# Patient Record
Sex: FEMALE | Race: WHITE | NOT HISPANIC OR LATINO | Employment: FULL TIME | URBAN - METROPOLITAN AREA
[De-identification: names, ages, dates, MRNs, and addresses within clinical notes are randomized per-mention and may not be internally consistent; named-entity substitution may affect disease eponyms.]

---

## 2023-04-20 LAB
ERYTHROCYTE DISTRIBUTION WIDTH (RATIO) BY AUTOMATED COUNT: 12.6 % (ref 11.5–14.5)
ERYTHROCYTE MEAN CORPUSCULAR HEMOGLOBIN CONCENTRATION (G/DL) BY AUTOMATED: 31.9 G/DL (ref 32–36)
ERYTHROCYTE MEAN CORPUSCULAR VOLUME (FL) BY AUTOMATED COUNT: 97 FL (ref 80–100)
ERYTHROCYTES (10*6/UL) IN BLOOD BY AUTOMATED COUNT: 4.23 X10E12/L (ref 4–5.2)
HEMATOCRIT (%) IN BLOOD BY AUTOMATED COUNT: 41.1 % (ref 36–46)
HEMOGLOBIN (G/DL) IN BLOOD: 13.1 G/DL (ref 12–16)
IRON (UG/DL) IN SER/PLAS: 80 UG/DL (ref 35–150)
IRON BINDING CAPACITY (UG/DL) IN SER/PLAS: 376 UG/DL (ref 240–445)
IRON SATURATION (%) IN SER/PLAS: 21 % (ref 25–45)
LEUKOCYTES (10*3/UL) IN BLOOD BY AUTOMATED COUNT: 4.3 X10E9/L (ref 4.4–11.3)
NRBC (PER 100 WBCS) BY AUTOMATED COUNT: 0 /100 WBC (ref 0–0)
PLATELETS (10*3/UL) IN BLOOD AUTOMATED COUNT: 226 X10E9/L (ref 150–450)

## 2023-08-26 LAB
ERYTHROCYTE DISTRIBUTION WIDTH (RATIO) BY AUTOMATED COUNT: 12.5 % (ref 11.5–14.5)
ERYTHROCYTE MEAN CORPUSCULAR HEMOGLOBIN CONCENTRATION (G/DL) BY AUTOMATED: 33.4 G/DL (ref 32–36)
ERYTHROCYTE MEAN CORPUSCULAR VOLUME (FL) BY AUTOMATED COUNT: 97 FL (ref 80–100)
ERYTHROCYTES (10*6/UL) IN BLOOD BY AUTOMATED COUNT: 4.31 X10E12/L (ref 4–5.2)
HEMATOCRIT (%) IN BLOOD BY AUTOMATED COUNT: 41.9 % (ref 36–46)
HEMOGLOBIN (G/DL) IN BLOOD: 14 G/DL (ref 12–16)
IRON (UG/DL) IN SER/PLAS: 180 UG/DL (ref 35–150)
IRON BINDING CAPACITY (UG/DL) IN SER/PLAS: 372 UG/DL (ref 240–445)
IRON SATURATION (%) IN SER/PLAS: 48 % (ref 25–45)
LEUKOCYTES (10*3/UL) IN BLOOD BY AUTOMATED COUNT: 3.9 X10E9/L (ref 4.4–11.3)
NRBC (PER 100 WBCS) BY AUTOMATED COUNT: 0 /100 WBC (ref 0–0)
PLATELETS (10*3/UL) IN BLOOD AUTOMATED COUNT: 221 X10E9/L (ref 150–450)

## 2024-01-15 DIAGNOSIS — R56.9 SEIZURE (MULTI): Primary | ICD-10-CM

## 2024-01-15 RX ORDER — CARBAMAZEPINE 300 MG/1
300 CAPSULE, EXTENDED RELEASE ORAL 2 TIMES DAILY
Qty: 60 CAPSULE | Refills: 11 | Status: SHIPPED | OUTPATIENT
Start: 2024-01-15 | End: 2024-01-19 | Stop reason: SDUPTHER

## 2024-01-15 RX ORDER — LAMOTRIGINE 100 MG/1
200 TABLET ORAL 2 TIMES DAILY
COMMUNITY
End: 2024-01-15 | Stop reason: SDUPTHER

## 2024-01-15 RX ORDER — LAMOTRIGINE 100 MG/1
200 TABLET ORAL 2 TIMES DAILY
Qty: 120 TABLET | Refills: 11 | Status: SHIPPED | OUTPATIENT
Start: 2024-01-15 | End: 2024-01-19 | Stop reason: SDUPTHER

## 2024-01-15 RX ORDER — CARBAMAZEPINE 300 MG/1
300 CAPSULE, EXTENDED RELEASE ORAL 2 TIMES DAILY
COMMUNITY
End: 2024-01-15 | Stop reason: SDUPTHER

## 2024-01-19 DIAGNOSIS — R56.9 SEIZURE (MULTI): ICD-10-CM

## 2024-01-19 RX ORDER — LAMOTRIGINE 100 MG/1
200 TABLET ORAL 2 TIMES DAILY
Qty: 120 TABLET | Refills: 11 | Status: SHIPPED | OUTPATIENT
Start: 2024-01-19 | End: 2025-01-18

## 2024-01-19 RX ORDER — CARBAMAZEPINE 300 MG/1
300 CAPSULE, EXTENDED RELEASE ORAL 2 TIMES DAILY
Qty: 60 CAPSULE | Refills: 11 | Status: SHIPPED | OUTPATIENT
Start: 2024-01-19 | End: 2025-01-18

## 2024-03-15 ENCOUNTER — HOSPITAL ENCOUNTER (OUTPATIENT)
Dept: RADIOLOGY | Facility: CLINIC | Age: 64
Discharge: HOME | End: 2024-03-15
Payer: COMMERCIAL

## 2024-03-15 DIAGNOSIS — N20.0 CALCULUS OF KIDNEY: ICD-10-CM

## 2024-03-15 PROCEDURE — 76770 US EXAM ABDO BACK WALL COMP: CPT | Performed by: STUDENT IN AN ORGANIZED HEALTH CARE EDUCATION/TRAINING PROGRAM

## 2024-03-15 PROCEDURE — 76770 US EXAM ABDO BACK WALL COMP: CPT

## 2024-03-19 ENCOUNTER — TELEMEDICINE (OUTPATIENT)
Dept: UROLOGY | Facility: CLINIC | Age: 64
End: 2024-03-19
Payer: COMMERCIAL

## 2024-03-19 DIAGNOSIS — N20.0 NEPHROLITHIASIS: Primary | ICD-10-CM

## 2024-03-19 PROCEDURE — 99213 OFFICE O/P EST LOW 20 MIN: CPT | Performed by: UROLOGY

## 2024-03-19 NOTE — PROGRESS NOTES
Subjective     This visit was completed via telemedicine. All issues as below were discussed and addressed but no physical exam was performed unless allowed by visual confirmation. If it was felt that the patient should be evaluated in clinic, then they were directed there. Patient verbally consented to visit.    Mirtha Weber is a 63 y.o. female with history of nephrolithiasis presenting today to review renal US. Patient has no new complaints. Denies any recent gross hematuria, fevers, chills, urinary retention, intractable flank or abdominal pain, nausea or vomiting.        Past Medical History:   Diagnosis Date    Personal history of other diseases of the nervous system and sense organs 10/05/2016    History of migraine headaches    Personal history of other drug therapy 02/05/2018    History of hepatitis A vaccination    Personal history of other specified conditions 04/15/2014    History of headache     Past Surgical History:   Procedure Laterality Date    COLONOSCOPY  02/05/2018    Complete Colonoscopy     No family history on file.  Current Outpatient Medications   Medication Sig Dispense Refill    carBAMazepine (Carbatrol) 300 mg 12 hr capsule Take 1 capsule (300 mg) by mouth 2 times a day. 60 capsule 11    lamoTRIgine (LaMICtal) 100 mg tablet Take 2 tablets (200 mg) by mouth 2 times a day. 120 tablet 11     No current facility-administered medications for this visit.     Not on File  Social History     Socioeconomic History    Marital status: Single     Spouse name: Not on file    Number of children: Not on file    Years of education: Not on file    Highest education level: Not on file   Occupational History    Not on file   Tobacco Use    Smoking status: Not on file    Smokeless tobacco: Not on file   Substance and Sexual Activity    Alcohol use: Not on file    Drug use: Not on file    Sexual activity: Not on file   Other Topics Concern    Not on file   Social History Narrative    Not on file     Social  Determinants of Health     Financial Resource Strain: Not on file   Food Insecurity: Not on file   Transportation Needs: Not on file   Physical Activity: Not on file   Stress: Not on file   Social Connections: Not on file   Intimate Partner Violence: Not on file   Housing Stability: Not on file       Review of Systems  Pertinent items are noted in HPI.    Objective       Lab Review  Lab Results   Component Value Date    WBC 3.9 (L) 08/26/2023    RBC 4.31 08/26/2023    HGB 14.0 08/26/2023    HCT 41.9 08/26/2023     08/26/2023      Lab Results   Component Value Date    BUN 11 03/17/2023    CREATININE 0.7 03/17/2023          Assessment/Plan   Diagnoses and all orders for this visit:  Nephrolithiasis  -     US renal complete; Future  Nephrolithiasis     I reviewed renal US from 3/15/2024 which showed non-obstructing right renal calculus measuring 5mm without hydronephrosis.       We will follow up annually with renal US to monitor stone presence and formation.     All questions were answered to the patient's satisfaction. Patient agrees with the plan and wishes to proceed. Follow-up will be scheduled appropriately.     Scribed for Dr. Coy by Kay Miller. I , Dr Coy, have personally reviewed and agreed with the information entered by the Virtual Scribe.

## 2024-04-09 ENCOUNTER — APPOINTMENT (OUTPATIENT)
Dept: UROLOGY | Facility: CLINIC | Age: 64
End: 2024-04-09
Payer: COMMERCIAL

## 2024-06-25 DIAGNOSIS — R56.9 SEIZURE (MULTI): ICD-10-CM

## 2024-06-25 RX ORDER — CARBAMAZEPINE 300 MG/1
300 CAPSULE, EXTENDED RELEASE ORAL 2 TIMES DAILY
Qty: 180 CAPSULE | Refills: 3 | Status: SHIPPED | OUTPATIENT
Start: 2024-06-25 | End: 2025-06-25

## 2024-06-25 RX ORDER — LAMOTRIGINE 100 MG/1
200 TABLET ORAL 2 TIMES DAILY
Qty: 360 TABLET | Refills: 3 | Status: SHIPPED | OUTPATIENT
Start: 2024-06-25 | End: 2025-06-25

## 2025-03-07 ENCOUNTER — HOSPITAL ENCOUNTER (OUTPATIENT)
Dept: RADIOLOGY | Facility: CLINIC | Age: 65
Discharge: HOME | End: 2025-03-07
Payer: COMMERCIAL

## 2025-03-07 DIAGNOSIS — N20.0 NEPHROLITHIASIS: ICD-10-CM

## 2025-03-07 PROCEDURE — 76770 US EXAM ABDO BACK WALL COMP: CPT

## 2025-03-21 ENCOUNTER — APPOINTMENT (OUTPATIENT)
Dept: UROLOGY | Facility: CLINIC | Age: 65
End: 2025-03-21
Payer: COMMERCIAL

## 2025-03-21 DIAGNOSIS — N20.0 NEPHROLITHIASIS: Primary | ICD-10-CM

## 2025-03-21 PROCEDURE — 1036F TOBACCO NON-USER: CPT | Performed by: UROLOGY

## 2025-03-21 PROCEDURE — 99213 OFFICE O/P EST LOW 20 MIN: CPT | Performed by: UROLOGY

## 2025-03-21 NOTE — PROGRESS NOTES
Scribed for Dr. Danita Coy by Clint Pozo. I, Dr. Danita Coy, have personally reviewed and agreed with the information entered by the Virtual Scribe. This visit was completed via telemedicine. All issues as below were discussed and addressed but no physical exam was performed unless allowed by visual confirmation. If it was felt that the patient should be evaluated in clinic, then they were directed there. Patient verbal consented to the visit. 03/21/25    History of Present Illness:  TODAY: (03/21/25)  Mirtha Weber is a 64 y.o. female with history of nephrolithiasis. Presents virtually for ultrasound for annual stone surveillance. Reports she has been doing well overall. No acute or worsening complaints. No flank pain, gross hematuria, or UTI's in the interim.     Renal ultrasound (03/07/25) personally reviewed and independently interpreted.  Right lower pole nonobstructive nephrolithiasis measuring 0.4 cm   Stone burden appears improved compared to last year.     Past Medical History:   Diagnosis Date    Personal history of other diseases of the nervous system and sense organs 10/05/2016    History of migraine headaches    Personal history of other drug therapy 02/05/2018    History of hepatitis A vaccination    Personal history of other specified conditions 04/15/2014    History of headache     Past Surgical History:   Procedure Laterality Date    COLONOSCOPY  02/05/2018    Complete Colonoscopy     No family history on file.  Social History     Tobacco Use   Smoking Status Never   Smokeless Tobacco Never     Current Outpatient Medications   Medication Sig Dispense Refill    carBAMazepine (Carbatrol) 300 mg 12 hr capsule Take 1 capsule (300 mg) by mouth 2 times a day. 180 capsule 3    lamoTRIgine (LaMICtal) 100 mg tablet TAKE 2 TABLETS (200 MG) BY MOUTH 2 TIMES A DAY. 360 tablet 3     No current facility-administered medications for this visit.     Not on File  Past medical, surgical, family and social  history in the chart was reviewed and is accurate including any additions to what is in this HPI.    Review of systems:   Pertinent information as listed in the HPI.        Objective   There were no vitals taken for this visit.  Physical Exam:  Exam limited 2/2 being a telehealth visit.     Lab Review:  Lab Results   Component Value Date    WBC 3.9 (L) 08/26/2023    RBC 4.31 08/26/2023    HGB 14.0 08/26/2023    HCT 41.9 08/26/2023     08/26/2023      Lab Results   Component Value Date    BUN 11 03/17/2023    CREATININE 0.7 03/17/2023      Lab Results   Component Value Date    HGBA1C 5.4 09/06/2016     Lab Results   Component Value Date    CHOL 247 (H) 09/08/2022    TRIG 58 09/08/2022    .9 09/08/2022    ALT 13 03/17/2023    AST 16 03/17/2023     03/17/2023    K 4.7 03/17/2023     03/17/2023    CO2 25 03/17/2023        ASSESSMENT:  Problem List Items Addressed This Visit       Nephrolithiasis - Primary    Relevant Orders    US renal complete      PLAN:  #Nephrolithiasis, RLP  Elects to continue stone surveillance.   RTC in 1 year with a renal ultrasound prior to.     Discussion:  We discussed that most calculi under 5 mm can be safely observed. This recommendation is based on large series looking at spontaneous passage rates that suggest that the likelihood of stone passage is highest for stones under 4 mm size (approximately 70-80%), moderate for stones between 4 and 6 mm (50%), and lowest for stones 6 mm or greater (20-30%).    I discussed with the patient the management options for kidney and ureteral stones, including observation, medical expulsive therapy, stent placement, rigid or flexible ureteroscopy with stone basket or laser stone fragmentation, ESWL; as well as advanced options for larger stones such as percutaneous and surgical approaches.    Patient was educated on stone prevention dietary modifications which include increased water intake, citric acid supplementation in the  form of lemon juice, low oxalate diet, moderate protein intake and low sodium intake. In addition, suggested avoiding dark-colored sodas. A daily urine output of 2.5 L is also recommended if feasible.     All questions were answered to the patient’s satisfaction.  Patient agrees with the plan and wishes to proceed.  Continue follow-up for ongoing care of her chronic medical conditions.    Scribed for Dr. Danita Coy by Clint Pozo.  I, Dr. Danita Coy, have personally reviewed and agreed with the information entered by the Virtual Scribe. 03/21/25

## 2025-06-18 ENCOUNTER — HOSPITAL ENCOUNTER (OUTPATIENT)
Dept: RADIOLOGY | Facility: CLINIC | Age: 65
Discharge: HOME | End: 2025-06-18
Payer: COMMERCIAL

## 2025-06-18 ENCOUNTER — OFFICE VISIT (OUTPATIENT)
Dept: OBSTETRICS AND GYNECOLOGY | Facility: CLINIC | Age: 65
End: 2025-06-18
Payer: COMMERCIAL

## 2025-06-18 VITALS
SYSTOLIC BLOOD PRESSURE: 117 MMHG | HEIGHT: 66 IN | WEIGHT: 145.8 LBS | DIASTOLIC BLOOD PRESSURE: 72 MMHG | BODY MASS INDEX: 23.43 KG/M2

## 2025-06-18 VITALS — WEIGHT: 145.8 LBS | HEIGHT: 66 IN | BODY MASS INDEX: 23.43 KG/M2

## 2025-06-18 DIAGNOSIS — Z12.31 ENCOUNTER FOR SCREENING MAMMOGRAM FOR MALIGNANT NEOPLASM OF BREAST: ICD-10-CM

## 2025-06-18 DIAGNOSIS — L29.2 VULVAR ITCHING: ICD-10-CM

## 2025-06-18 DIAGNOSIS — Z01.419 ENCOUNTER FOR ANNUAL ROUTINE GYNECOLOGICAL EXAMINATION: Primary | ICD-10-CM

## 2025-06-18 PROCEDURE — 3008F BODY MASS INDEX DOCD: CPT

## 2025-06-18 PROCEDURE — 99396 PREV VISIT EST AGE 40-64: CPT

## 2025-06-18 PROCEDURE — 77067 SCR MAMMO BI INCL CAD: CPT

## 2025-06-18 PROCEDURE — 77063 BREAST TOMOSYNTHESIS BI: CPT | Performed by: RADIOLOGY

## 2025-06-18 PROCEDURE — 77067 SCR MAMMO BI INCL CAD: CPT | Performed by: RADIOLOGY

## 2025-06-18 PROCEDURE — 1036F TOBACCO NON-USER: CPT

## 2025-06-18 RX ORDER — CLOTRIMAZOLE AND BETAMETHASONE DIPROPIONATE 10; .64 MG/G; MG/G
1 CREAM TOPICAL AS NEEDED
Qty: 15 G | Refills: 2 | Status: SHIPPED | OUTPATIENT
Start: 2025-06-18 | End: 2025-07-02

## 2025-06-18 ASSESSMENT — ENCOUNTER SYMPTOMS
SHORTNESS OF BREATH: 0
UNEXPECTED WEIGHT CHANGE: 0
FEVER: 0
DEPRESSION: 0
CHILLS: 0
NAUSEA: 0
ABDOMINAL PAIN: 0
FATIGUE: 0
DYSURIA: 0
COUGH: 0
HEADACHES: 0
LOSS OF SENSATION IN FEET: 0
DIZZINESS: 0
OCCASIONAL FEELINGS OF UNSTEADINESS: 0
COLOR CHANGE: 0
VOMITING: 0

## 2025-06-18 ASSESSMENT — PAIN SCALES - GENERAL: PAINLEVEL_OUTOF10: 0-NO PAIN

## 2025-06-18 ASSESSMENT — LIFESTYLE VARIABLES
HOW MANY STANDARD DRINKS CONTAINING ALCOHOL DO YOU HAVE ON A TYPICAL DAY: 1 OR 2
HOW OFTEN DO YOU HAVE SIX OR MORE DRINKS ON ONE OCCASION: NEVER
SKIP TO QUESTIONS 9-10: 1
HOW OFTEN DO YOU HAVE A DRINK CONTAINING ALCOHOL: MONTHLY OR LESS
AUDIT-C TOTAL SCORE: 1

## 2025-06-18 ASSESSMENT — PATIENT HEALTH QUESTIONNAIRE - PHQ9
2. FEELING DOWN, DEPRESSED OR HOPELESS: NOT AT ALL
1. LITTLE INTEREST OR PLEASURE IN DOING THINGS: NOT AT ALL
SUM OF ALL RESPONSES TO PHQ9 QUESTIONS 1 & 2: 0

## 2025-06-18 NOTE — PROGRESS NOTES
"Subjective   Mirtha Weber is a 64 y.o. female who is here for a routine GYN exam. Last saw her 2023.  -Denies vaginal bleeding, pelvic pain, pressure, or bloating.  -Denies breast or vaginal concerns today.      Complaints:   none  HRT: no  History of abnormal Pap smear: no  History of abnormal mammogram: no      OB History          0    Para   0    Term   0       0    AB   0    Living   0         SAB   0    IAB   0    Ectopic   0    Multiple   0    Live Births   0                  Review of Systems   Constitutional:  Negative for chills, fatigue, fever and unexpected weight change.   Respiratory:  Negative for cough and shortness of breath.    Gastrointestinal:  Negative for abdominal pain, nausea and vomiting.   Genitourinary:  Negative for dyspareunia, dysuria, pelvic pain and vaginal discharge.   Skin:  Negative for color change and rash.   Neurological:  Negative for dizziness and headaches.       Objective   /72   Ht 1.676 m (5' 6\")   Wt 66.1 kg (145 lb 12.8 oz)   BMI 23.53 kg/m²        General:   Alert and oriented, in no acute distress   Neck: Supple. No visible thyromegaly.    Breast/Axilla: Normal to palpation bilaterally without masses, skin changes, or nipple discharge.    Abdomen: Soft, non-tender, without masses or organomegaly   Vulva: Normal architecture without erythema, masses, or lesions.    Vagina: Atrophic mucosa without lesions or masses. No abnormal vaginal discharge.    Cervix: Normal without masses, lesions, or signs of cervicitis   Uterus: Normal, mobile, non-enlarged uterus   Adnexa: Normal without masses or lesions   Pelvic Floor Normal    Psych Normal affect. Normal mood.      Assessment/Plan   Diagnoses and all orders for this visit:  Encounter for annual routine gynecological examination   - UTD on pap smear, next due 2026. Can plan to be final pap.    - Recommended Replens or Hyalogyn moisturizers as needed for any bothersome vaginal atrophy " symptoms.  Encounter for screening mammogram for malignant neoplasm of breast  -     BI mammo bilateral screening tomosynthesis; Future  Vulvar itching  -     clotrimazole-betamethasone (Lotrisone) cream; Apply 1 Application topically if needed (itching or inflammation) for up to 14 days.    Meghann Musa PA-C

## 2025-07-08 ENCOUNTER — OFFICE VISIT (OUTPATIENT)
Dept: PRIMARY CARE | Facility: CLINIC | Age: 65
End: 2025-07-08
Payer: COMMERCIAL

## 2025-07-08 VITALS
SYSTOLIC BLOOD PRESSURE: 110 MMHG | DIASTOLIC BLOOD PRESSURE: 80 MMHG | WEIGHT: 150 LBS | HEIGHT: 66 IN | TEMPERATURE: 97.3 F | BODY MASS INDEX: 24.11 KG/M2 | HEART RATE: 79 BPM | OXYGEN SATURATION: 99 %

## 2025-07-08 DIAGNOSIS — R73.9 HYPERGLYCEMIA: ICD-10-CM

## 2025-07-08 DIAGNOSIS — E55.9 VITAMIN D DEFICIENCY: ICD-10-CM

## 2025-07-08 DIAGNOSIS — G40.309: ICD-10-CM

## 2025-07-08 DIAGNOSIS — E78.2 MIXED HYPERLIPIDEMIA: ICD-10-CM

## 2025-07-08 DIAGNOSIS — Z23 ENCOUNTER FOR IMMUNIZATION: ICD-10-CM

## 2025-07-08 DIAGNOSIS — Z00.00 ANNUAL PHYSICAL EXAM: Primary | ICD-10-CM

## 2025-07-08 DIAGNOSIS — D64.9 ANEMIA, UNSPECIFIED TYPE: ICD-10-CM

## 2025-07-08 DIAGNOSIS — Z11.59 NEED FOR HEPATITIS C SCREENING TEST: ICD-10-CM

## 2025-07-08 PROCEDURE — 99386 PREV VISIT NEW AGE 40-64: CPT | Performed by: NURSE PRACTITIONER

## 2025-07-08 PROCEDURE — 90471 IMMUNIZATION ADMIN: CPT | Performed by: NURSE PRACTITIONER

## 2025-07-08 PROCEDURE — 1036F TOBACCO NON-USER: CPT | Performed by: NURSE PRACTITIONER

## 2025-07-08 PROCEDURE — 90677 PCV20 VACCINE IM: CPT | Performed by: NURSE PRACTITIONER

## 2025-07-08 PROCEDURE — 3008F BODY MASS INDEX DOCD: CPT | Performed by: NURSE PRACTITIONER

## 2025-07-08 RX ORDER — CARBAMAZEPINE 300 MG/1
300 CAPSULE, EXTENDED RELEASE ORAL 2 TIMES DAILY
Qty: 90 CAPSULE | Refills: 3 | Status: SHIPPED | OUTPATIENT
Start: 2025-07-08

## 2025-07-08 RX ORDER — CARBAMAZEPINE 300 MG/1
300 CAPSULE, EXTENDED RELEASE ORAL 2 TIMES DAILY
COMMUNITY
End: 2025-07-08 | Stop reason: SDUPTHER

## 2025-07-08 RX ORDER — LAMOTRIGINE 100 MG/1
200 TABLET ORAL 2 TIMES DAILY
Qty: 90 TABLET | Refills: 3 | Status: SHIPPED | OUTPATIENT
Start: 2025-07-08

## 2025-07-08 RX ORDER — LAMOTRIGINE 100 MG/1
200 TABLET ORAL 2 TIMES DAILY
COMMUNITY
End: 2025-07-08 | Stop reason: SDUPTHER

## 2025-07-08 RX ORDER — CLOTRIMAZOLE AND BETAMETHASONE DIPROPIONATE 10; .64 MG/G; MG/G
1 CREAM TOPICAL AS NEEDED
COMMUNITY

## 2025-07-08 ASSESSMENT — ENCOUNTER SYMPTOMS
SPEECH DIFFICULTY: 0
NAUSEA: 0
POLYPHAGIA: 0
HEMATURIA: 0
DEPRESSION: 0
CHILLS: 0
FLANK PAIN: 0
ABDOMINAL PAIN: 0
CHEST TIGHTNESS: 0
FEVER: 0
DYSURIA: 0
SHORTNESS OF BREATH: 0
FATIGUE: 0
DIZZINESS: 0
DIAPHORESIS: 0
HEADACHES: 0
ADENOPATHY: 0
SEIZURES: 1
BACK PAIN: 0
LOSS OF SENSATION IN FEET: 0
NECK PAIN: 0
PALPITATIONS: 0
POLYDIPSIA: 0
FACIAL ASYMMETRY: 0
BRUISES/BLEEDS EASILY: 0
VOMITING: 0
OCCASIONAL FEELINGS OF UNSTEADINESS: 0
BLOOD IN STOOL: 0
WOUND: 0
AGITATION: 0
COUGH: 0

## 2025-07-08 ASSESSMENT — LIFESTYLE VARIABLES
HAS A RELATIVE, FRIEND, DOCTOR, OR ANOTHER HEALTH PROFESSIONAL EXPRESSED CONCERN ABOUT YOUR DRINKING OR SUGGESTED YOU CUT DOWN: NO
AUDIT TOTAL SCORE: 2
HOW OFTEN DURING THE LAST YEAR HAVE YOU BEEN UNABLE TO REMEMBER WHAT HAPPENED THE NIGHT BEFORE BECAUSE YOU HAD BEEN DRINKING: NEVER
SKIP TO QUESTIONS 9-10: 1
HOW OFTEN DURING THE LAST YEAR HAVE YOU FAILED TO DO WHAT WAS NORMALLY EXPECTED FROM YOU BECAUSE OF DRINKING: NEVER
AUDIT-C TOTAL SCORE: 2
HOW OFTEN DURING THE LAST YEAR HAVE YOU NEEDED AN ALCOHOLIC DRINK FIRST THING IN THE MORNING TO GET YOURSELF GOING AFTER A NIGHT OF HEAVY DRINKING: NEVER
HAVE YOU OR SOMEONE ELSE BEEN INJURED AS A RESULT OF YOUR DRINKING: NO
HOW OFTEN DURING THE LAST YEAR HAVE YOU HAD A FEELING OF GUILT OR REMORSE AFTER DRINKING: NEVER
HOW OFTEN DO YOU HAVE A DRINK CONTAINING ALCOHOL: 2-4 TIMES A MONTH
HOW OFTEN DO YOU HAVE SIX OR MORE DRINKS ON ONE OCCASION: NEVER
HOW MANY STANDARD DRINKS CONTAINING ALCOHOL DO YOU HAVE ON A TYPICAL DAY: 1 OR 2
HOW OFTEN DURING THE LAST YEAR HAVE YOU FOUND THAT YOU WERE NOT ABLE TO STOP DRINKING ONCE YOU HAD STARTED: NEVER

## 2025-07-08 ASSESSMENT — PATIENT HEALTH QUESTIONNAIRE - PHQ9
1. LITTLE INTEREST OR PLEASURE IN DOING THINGS: NOT AT ALL
SUM OF ALL RESPONSES TO PHQ9 QUESTIONS 1 AND 2: 0
2. FEELING DOWN, DEPRESSED OR HOPELESS: NOT AT ALL

## 2025-07-08 ASSESSMENT — PAIN SCALES - GENERAL: PAINLEVEL_OUTOF10: 0-NO PAIN

## 2025-07-08 NOTE — PROGRESS NOTES
"[facility]: [facility]  PHYSICAL EXAM      Mirtha Weber is a 64 y.o. female that is presenting today to establish with PCP. She is due for Annual Physical Exam.    Encouraged Covid 19 immunization via local pharmacy.    Advised Pneumovax immunization today. Agreed.    She was seen by her OB/GYN provider, Rola Musa PA-C, on 06/18/25 for annual well woman care.    History of renal calculi followed by Urology, Dr. Coy. Follow up scheduled for 03/24/26.    Ms. Weber reports she is followed by Neurology for seizures. Reports no seizure for \"decades\".      Assessment/Plan   Diagnoses and all orders for this visit:    Annual physical exam        -     Routine and preventative care provided and discussed with patient    Epilepsy, nonconvulsive (Multi)  -     No recent seizure activity  -     continue established follow up with Neurology  -     carBAMazepine (Carbatrol) 300 mg 12 hr capsule; Take 1 capsule (300 mg) by mouth 2 times a day. Do not crush or chew.  -     lamoTRIgine (LaMICtal) 100 mg tablet; Take 2 tablets (200 mg) by mouth 2 times a day.  -     Comprehensive Metabolic Panel; Future  -     Carbamazepine level, total; Future  -     Lamotrigine level; Future    Anemia, unspecified type  -     CBC and Auto Differential; Future  -     Comprehensive Metabolic Panel; Future    Vitamin D deficiency  -     Vitamin D 25-Hydroxy,Total (for eval of Vitamin D levels); Future  -     continue daily OTC Vitamin D supplement    Encounter for immunization  -     Pneumococcal conjugate vaccine, 20-valent (PREVNAR 20)    Mixed hyperlipidemia  -     Comprehensive Metabolic Panel; Future  -     Lipid Panel; Future    Need for hepatitis C screening test  -     Hepatitis C antibody; Future    Hyperglycemia  -     Hemoglobin A1C; Future    Other orders  -     Follow Up In Primary Care - Health Maintenance; Future    Subjective   HPI  Review of Systems   Constitutional:  Negative for chills, diaphoresis, " fatigue and fever.   HENT:  Negative for hearing loss and mouth sores.    Eyes:  Negative for visual disturbance.   Respiratory:  Negative for cough, chest tightness and shortness of breath.    Cardiovascular:  Negative for chest pain, palpitations and leg swelling.   Gastrointestinal:  Negative for abdominal pain, blood in stool, nausea and vomiting.   Endocrine: Negative for cold intolerance, heat intolerance, polydipsia, polyphagia and polyuria.   Genitourinary:  Negative for dysuria, flank pain and hematuria.   Musculoskeletal:  Negative for back pain and neck pain.   Skin:  Negative for rash and wound.   Allergic/Immunologic: Negative for environmental allergies, food allergies and immunocompromised state.   Neurological:  Positive for seizures. Negative for dizziness, syncope, facial asymmetry, speech difficulty and headaches.   Hematological:  Negative for adenopathy. Does not bruise/bleed easily.   Psychiatric/Behavioral:  Negative for agitation and behavioral problems.       Objective   Vitals:    07/08/25 1255   BP: 110/80   Pulse: 79   Temp: 36.3 °C (97.3 °F)   SpO2: 99%     Body mass index is 24.21 kg/m².  Physical Exam  Vitals and nursing note reviewed.   Constitutional:       General: She is not in acute distress.     Appearance: Normal appearance. She is not ill-appearing.   HENT:      Head: Normocephalic and atraumatic.      Right Ear: Tympanic membrane, ear canal and external ear normal. There is no impacted cerumen.      Left Ear: Tympanic membrane, ear canal and external ear normal. There is no impacted cerumen.      Nose: Nose normal.      Mouth/Throat:      Mouth: Mucous membranes are moist.      Pharynx: Oropharynx is clear. No oropharyngeal exudate or posterior oropharyngeal erythema.   Eyes:      General: No scleral icterus.        Right eye: No discharge.         Left eye: No discharge.      Extraocular Movements: Extraocular movements intact.      Conjunctiva/sclera: Conjunctivae normal.       Pupils: Pupils are equal, round, and reactive to light.   Neck:      Vascular: No carotid bruit.   Cardiovascular:      Rate and Rhythm: Normal rate and regular rhythm.      Pulses: Normal pulses.      Heart sounds: Normal heart sounds. No murmur heard.  Pulmonary:      Effort: Pulmonary effort is normal. No respiratory distress.      Breath sounds: Normal breath sounds.   Abdominal:      General: Abdomen is flat. Bowel sounds are normal. There is no distension.      Palpations: Abdomen is soft. There is no mass.      Tenderness: There is no abdominal tenderness. There is no right CVA tenderness or left CVA tenderness.   Musculoskeletal:         General: No tenderness. Normal range of motion.      Cervical back: No tenderness.      Right lower leg: No edema.      Left lower leg: No edema.   Lymphadenopathy:      Cervical: No cervical adenopathy.   Skin:     General: Skin is warm and dry.      Coloration: Skin is not jaundiced.      Findings: No rash.   Neurological:      General: No focal deficit present.      Mental Status: She is alert and oriented to person, place, and time. Mental status is at baseline.   Psychiatric:         Mood and Affect: Mood normal.         Behavior: Behavior normal.       Diagnostic Results   Lab Results   Component Value Date    GLUCOSE 95 03/17/2023    CALCIUM 9.6 03/17/2023     03/17/2023    K 4.7 03/17/2023    CO2 25 03/17/2023     03/17/2023    BUN 11 03/17/2023    CREATININE 0.7 03/17/2023     Lab Results   Component Value Date    ALT 13 03/17/2023    AST 16 03/17/2023    ALKPHOS 85 03/17/2023    BILITOT 0.2 03/17/2023     Lab Results   Component Value Date    WBC 3.9 (L) 08/26/2023    HGB 14.0 08/26/2023    HCT 41.9 08/26/2023    MCV 97 08/26/2023     08/26/2023     Lab Results   Component Value Date    CHOL 247 (H) 09/08/2022    CHOL 241 (H) 02/22/2018     Lab Results   Component Value Date    .9 09/08/2022    .4 02/22/2018     No results found  "for: \"LDLCALC\"  Lab Results   Component Value Date    TRIG 58 09/08/2022    TRIG 56 02/22/2018     No components found for: \"CHOLHDL\"  Lab Results   Component Value Date    HGBA1C 5.4 09/06/2016     Other labs not included in the list above were reviewed either before or during this encounter.    History   Medical History[1]  Surgical History[2]  Family History[3]  Social History     Socioeconomic History    Marital status: Single     Spouse name: Not on file    Number of children: Not on file    Years of education: Not on file    Highest education level: Not on file   Occupational History    Not on file   Tobacco Use    Smoking status: Former     Current packs/day: 1.00     Average packs/day: 1 pack/day for 20.0 years (20.0 ttl pk-yrs)     Types: Cigarettes    Smokeless tobacco: Never   Vaping Use    Vaping status: Never Used   Substance and Sexual Activity    Alcohol use: Yes     Alcohol/week: 2.0 standard drinks of alcohol     Types: 2 Standard drinks or equivalent per week     Comment: Occasionally    Drug use: Never    Sexual activity: Not Currently     Birth control/protection: Abstinence   Other Topics Concern    Not on file   Social History Narrative    Not on file     Social Drivers of Health     Financial Resource Strain: Not on file   Food Insecurity: Not on file   Transportation Needs: Not on file   Physical Activity: Not on file   Stress: Not on file   Social Connections: Not on file   Intimate Partner Violence: Not on file   Housing Stability: Not on file     Allergies[4]  Medications Ordered Prior to Encounter[5]  Immunization History   Administered Date(s) Administered    Moderna SARS-CoV-2 Vaccination 03/12/2021, 04/09/2021, 10/24/2021    RSV, 60 Years And Older (AREXVY) 11/10/2023     Patient's medical history was reviewed and updated either before or during this encounter.       Gwen Tamez, APRN-CNP       [1]   Past Medical History:  Diagnosis Date    Anemia Not sure    Eczema     Epilepsy  "    Kidney stone     Personal history of other diseases of the nervous system and sense organs 10/05/2016    History of migraine headaches    Personal history of other drug therapy 02/05/2018    History of hepatitis A vaccination    Personal history of other specified conditions 04/15/2014    History of headache    Urinary tract infection    [2]   Past Surgical History:  Procedure Laterality Date    COLONOSCOPY  02/05/2018    Complete Colonoscopy    COLPOSCOPY     [3]   Family History  Problem Relation Name Age of Onset    Leukemia Mother Helen Bell     Lung cancer Father Malachi Bell     Ovarian cancer Neg Hx      Cancer Mother Helen Bell 80 - 99    Arthritis Mother Helen Bell     Cancer Father Malachi Bell 70 - 79    Arthritis Father Malachi Bell     Heart disease Father Malachi Bell 70 - 79    Breast cancer Paternal Grandmother sowmya bell     Kidney disease Mother's Brother Ck Moyer 20 - 29   [4]   Allergies  Allergen Reactions    Phenytoin Rash and Unknown   [5]   Current Outpatient Medications on File Prior to Visit   Medication Sig Dispense Refill    clotrimazole-betamethasone (Lotrisone) cream Apply 1 Application topically if needed.      [DISCONTINUED] carBAMazepine (Carbatrol) 300 mg 12 hr capsule Take 1 capsule (300 mg) by mouth 2 times a day. Do not crush or chew.      [DISCONTINUED] lamoTRIgine (LaMICtal) 100 mg tablet Take 2 tablets (200 mg) by mouth 2 times a day.      [DISCONTINUED] carBAMazepine (Carbatrol) 300 mg 12 hr capsule Take 1 capsule (300 mg) by mouth 2 times a day. 180 capsule 3    [DISCONTINUED] clotrimazole-betamethasone (Lotrisone) cream Apply 1 Application topically if needed (itching or inflammation) for up to 14 days. 15 g 2    [DISCONTINUED] lamoTRIgine (LaMICtal) 100 mg tablet TAKE 2 TABLETS (200 MG) BY MOUTH 2 TIMES A DAY. 360 tablet 3     No current facility-administered medications on file prior to visit.

## 2025-08-12 LAB
EST. AVERAGE GLUCOSE BLD GHB EST-MCNC: 105 MG/DL
EST. AVERAGE GLUCOSE BLD GHB EST-SCNC: 5.8 MMOL/L
HBA1C MFR BLD: 5.3 %

## 2025-08-13 ENCOUNTER — OFFICE VISIT (OUTPATIENT)
Dept: GASTROENTEROLOGY | Facility: CLINIC | Age: 65
End: 2025-08-13
Payer: COMMERCIAL

## 2025-08-13 VITALS — HEART RATE: 71 BPM | WEIGHT: 142 LBS | HEIGHT: 66 IN | BODY MASS INDEX: 22.82 KG/M2

## 2025-08-13 DIAGNOSIS — R19.5 LOOSE STOOLS: Primary | ICD-10-CM

## 2025-08-13 DIAGNOSIS — R10.13 DYSPEPSIA: ICD-10-CM

## 2025-08-13 LAB
25(OH)D3+25(OH)D2 SERPL-MCNC: 41 NG/ML (ref 30–100)
ALBUMIN SERPL-MCNC: 4.7 G/DL (ref 3.6–5.1)
ALP SERPL-CCNC: 85 U/L (ref 37–153)
ALT SERPL-CCNC: 13 U/L (ref 6–29)
ANION GAP SERPL CALCULATED.4IONS-SCNC: 9 MMOL/L (CALC) (ref 7–17)
AST SERPL-CCNC: 17 U/L (ref 10–35)
BASOPHILS # BLD AUTO: 21 CELLS/UL (ref 0–200)
BASOPHILS NFR BLD AUTO: 0.5 %
BILIRUB SERPL-MCNC: 0.4 MG/DL (ref 0.2–1.2)
BUN SERPL-MCNC: 10 MG/DL (ref 7–25)
CALCIUM SERPL-MCNC: 10.2 MG/DL (ref 8.6–10.4)
CARBAMAZEPINE SERPL-MCNC: 6.9 MG/L (ref 4–12)
CHLORIDE SERPL-SCNC: 102 MMOL/L (ref 98–110)
CHOLEST SERPL-MCNC: 253 MG/DL
CHOLEST/HDLC SERPL: 2.2 (CALC)
CO2 SERPL-SCNC: 29 MMOL/L (ref 20–32)
CREAT SERPL-MCNC: 0.68 MG/DL (ref 0.5–1.05)
EGFRCR SERPLBLD CKD-EPI 2021: 97 ML/MIN/1.73M2
EOSINOPHIL # BLD AUTO: 517 CELLS/UL (ref 15–500)
EOSINOPHIL NFR BLD AUTO: 12.6 %
ERYTHROCYTE [DISTWIDTH] IN BLOOD BY AUTOMATED COUNT: 11.8 % (ref 11–15)
GLUCOSE SERPL-MCNC: 94 MG/DL (ref 65–99)
HCT VFR BLD AUTO: 42.5 % (ref 35–45)
HCV AB SERPL QL IA: NORMAL
HDLC SERPL-MCNC: 113 MG/DL
HGB BLD-MCNC: 14.1 G/DL (ref 11.7–15.5)
LAMOTRIGINE SERPL-MCNC: 6.4 MCG/ML (ref 2.5–15)
LDLC SERPL CALC-MCNC: 124 MG/DL (CALC)
LYMPHOCYTES # BLD AUTO: 1267 CELLS/UL (ref 850–3900)
LYMPHOCYTES NFR BLD AUTO: 30.9 %
MCH RBC QN AUTO: 32.1 PG (ref 27–33)
MCHC RBC AUTO-ENTMCNC: 33.2 G/DL (ref 32–36)
MCV RBC AUTO: 96.8 FL (ref 80–100)
MONOCYTES # BLD AUTO: 357 CELLS/UL (ref 200–950)
MONOCYTES NFR BLD AUTO: 8.7 %
NEUTROPHILS # BLD AUTO: 1939 CELLS/UL (ref 1500–7800)
NEUTROPHILS NFR BLD AUTO: 47.3 %
NONHDLC SERPL-MCNC: 140 MG/DL (CALC)
PLATELET # BLD AUTO: 237 THOUSAND/UL (ref 140–400)
PMV BLD REES-ECKER: 10.5 FL (ref 7.5–12.5)
POTASSIUM SERPL-SCNC: 4.2 MMOL/L (ref 3.5–5.3)
PROT SERPL-MCNC: 7.1 G/DL (ref 6.1–8.1)
RBC # BLD AUTO: 4.39 MILLION/UL (ref 3.8–5.1)
SODIUM SERPL-SCNC: 140 MMOL/L (ref 135–146)
TRIGL SERPL-MCNC: 66 MG/DL
WBC # BLD AUTO: 4.1 THOUSAND/UL (ref 3.8–10.8)

## 2025-08-13 PROCEDURE — 99203 OFFICE O/P NEW LOW 30 MIN: CPT

## 2025-08-13 PROCEDURE — 3008F BODY MASS INDEX DOCD: CPT

## 2025-08-13 RX ORDER — FAMOTIDINE 40 MG/1
40 TABLET, FILM COATED ORAL DAILY
Qty: 30 TABLET | Refills: 3 | Status: SHIPPED | OUTPATIENT
Start: 2025-08-13 | End: 2026-08-13

## 2025-08-13 ASSESSMENT — ENCOUNTER SYMPTOMS
FEVER: 0
ABDOMINAL PAIN: 1
VOMITING: 0
COUGH: 0
FATIGUE: 0
BLOOD IN STOOL: 0
SHORTNESS OF BREATH: 0
TROUBLE SWALLOWING: 0
CHILLS: 0
ABDOMINAL DISTENTION: 1
ANAL BLEEDING: 0
CONSTIPATION: 0
APPETITE CHANGE: 0
NAUSEA: 0
RECTAL PAIN: 0
DIARRHEA: 1

## 2025-08-15 LAB
CRYPTOSP AG STL QL IA: NORMAL
G LAMBLIA AG STL QL IA: NORMAL
O+P STL CONC: NORMAL
O+P STL TRI STN: NORMAL

## 2025-08-31 LAB
CRYPTOSP AG STL QL IA: NORMAL
G LAMBLIA AG STL QL IA: NORMAL
O+P STL TRI STN: NORMAL

## 2025-09-03 DIAGNOSIS — R10.9 ABDOMINAL PAIN, UNSPECIFIED ABDOMINAL LOCATION: Primary | ICD-10-CM

## 2025-09-03 DIAGNOSIS — R10.13 DYSPEPSIA: Primary | ICD-10-CM

## 2025-09-03 DIAGNOSIS — R19.5 LOOSE STOOLS: ICD-10-CM

## 2025-09-03 RX ORDER — DICYCLOMINE HYDROCHLORIDE 10 MG/1
10 CAPSULE ORAL 4 TIMES DAILY PRN
Qty: 60 CAPSULE | Refills: 0 | Status: SHIPPED | OUTPATIENT
Start: 2025-09-03 | End: 2026-09-03

## 2025-09-11 ENCOUNTER — APPOINTMENT (OUTPATIENT)
Dept: PRIMARY CARE | Facility: CLINIC | Age: 65
End: 2025-09-11
Payer: COMMERCIAL

## 2025-09-25 ENCOUNTER — APPOINTMENT (OUTPATIENT)
Dept: GASTROENTEROLOGY | Facility: EXTERNAL LOCATION | Age: 65
End: 2025-09-25
Payer: COMMERCIAL

## 2025-10-30 ENCOUNTER — APPOINTMENT (OUTPATIENT)
Dept: GASTROENTEROLOGY | Facility: CLINIC | Age: 65
End: 2025-10-30
Payer: COMMERCIAL

## 2026-03-24 ENCOUNTER — APPOINTMENT (OUTPATIENT)
Dept: UROLOGY | Facility: CLINIC | Age: 66
End: 2026-03-24
Payer: COMMERCIAL